# Patient Record
Sex: FEMALE | Race: WHITE
[De-identification: names, ages, dates, MRNs, and addresses within clinical notes are randomized per-mention and may not be internally consistent; named-entity substitution may affect disease eponyms.]

---

## 2017-02-07 ENCOUNTER — HOSPITAL ENCOUNTER (OUTPATIENT)
Dept: HOSPITAL 80 - FIMAGING | Age: 77
End: 2017-02-07
Attending: INTERNAL MEDICINE
Payer: COMMERCIAL

## 2017-02-07 DIAGNOSIS — R10.13: Primary | ICD-10-CM

## 2017-02-07 DIAGNOSIS — K21.9: ICD-10-CM

## 2017-02-07 DIAGNOSIS — K44.9: ICD-10-CM

## 2017-02-07 NOTE — DX
Biphasic Upper GI Series 



CLINICAL HISTORY: 77-year-old female with epigastric pain.



TECHNIQUE: Initially, thin barium was ingested by the patient while standing, and fluoroscopy was per
formed. Subsequently, the patient ingested effervescent crystals and thick barium and imaging of the 
hypopharynx and of the cervical and thoracic esophagus was performed while standing. The patient also
 ingested a 13 mm tablet with water. The patient was then placed in a prone ROCK position, and thin ba
rium was ingested while a Valsalva maneuver was performed. The patient was placed in a prone position
, and a Valsalva maneuver was again performed. Imaging of the stomach and the proximal small bowel wa
s obtained with the patient in AP, right and left posterior oblique, left lateral decubitus, and pron
e positions.



Fluoroscopy Time: 2.5 minutes (exposure dose of 26.80mGy)



COMPARISON STUDY: None.



FINDINGS: There is normal oral pharyngeal propulsion of the bolus into the hypopharynx with a normal,
 symmetrical appearance to the vallecula and the piriform sinuses. The posterior cervical esophagus i
s normal. There are some small ventral traction spurs noted, which did not significantly impede flow.
 There is no cricopharyngeus spasm, achalasia, or Zenker's diverticulum. There is no aspiration. Ther
e is antegrade esophageal motility, and a normal-appearing mucosa. There is no esophageal stricture. 
There is no esophageal ulceration, pulsion diverticulum, or stricture. There is a small central slidi
ng hiatal hernia with Valsalva. This was associated with some low-level gastroesophageal reflux. The 
stomach is normal in size, shape, position, and mucosal fold pattern. There is prompt egress of bariu
m into a normal-appearing duodenal bulb and C-sweep. Incidental note is made of some calcified hilar 
lymph nodes, consistent with old granulomatous disease.



I provided a preliminary interpretation to the patient at times in performance. I also attempted to c
onvey the information to Dr. Sebastian Palomo, at 1:00 p.m. on February 07, 2017.



IMPRESSION: 

Small central sliding hiatal hernia noted when the patient was supine. This was associated with some 
low-level gastroesophageal reflux, although there is no esophageal or gastric ulceration.

## 2017-06-01 ENCOUNTER — HOSPITAL ENCOUNTER (OUTPATIENT)
Dept: HOSPITAL 80 - FIMAGING | Age: 77
Discharge: HOME | End: 2017-06-01
Attending: PHYSICIAN ASSISTANT
Payer: COMMERCIAL

## 2017-06-01 DIAGNOSIS — M51.37: ICD-10-CM

## 2017-06-01 DIAGNOSIS — M54.5: Primary | ICD-10-CM

## 2017-06-01 DIAGNOSIS — M51.36: ICD-10-CM

## 2017-06-01 PROCEDURE — 3E0S33Z INTRODUCTION OF ANTI-INFLAMMATORY INTO EPIDURAL SPACE, PERCUTANEOUS APPROACH: ICD-10-PCS | Performed by: RADIOLOGY

## 2017-06-01 PROCEDURE — 3E0T3BZ INTRODUCTION OF ANESTHETIC AGENT INTO PERIPHERAL NERVES AND PLEXI, PERCUTANEOUS APPROACH: ICD-10-PCS | Performed by: RADIOLOGY

## 2018-09-23 ENCOUNTER — HOSPITAL ENCOUNTER (EMERGENCY)
Dept: HOSPITAL 80 - FED | Age: 78
Discharge: HOME | End: 2018-09-23
Payer: COMMERCIAL

## 2018-09-23 VITALS — DIASTOLIC BLOOD PRESSURE: 111 MMHG | SYSTOLIC BLOOD PRESSURE: 162 MMHG
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
## 2018-09-23 DIAGNOSIS — S51.011A: Primary | ICD-10-CM
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
## 2018-09-23 PROCEDURE — 0HQDXZZ REPAIR RIGHT LOWER ARM SKIN, EXTERNAL APPROACH: ICD-10-PCS

## 2018-09-23 RX ADMIN — LIDOCAINE-EPINEPHRINE-TETRACAINE GEL 4-0.05-0.5% ONE EA: 4-0.05-0.5 GEL at 12:36
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
